# Patient Record
Sex: FEMALE | Race: BLACK OR AFRICAN AMERICAN | Employment: UNEMPLOYED | URBAN - METROPOLITAN AREA
[De-identification: names, ages, dates, MRNs, and addresses within clinical notes are randomized per-mention and may not be internally consistent; named-entity substitution may affect disease eponyms.]

---

## 2018-02-01 ENCOUNTER — HOSPITAL ENCOUNTER (OUTPATIENT)
Dept: VASCULAR SURGERY | Age: 78
Discharge: HOME | End: 2018-02-01
Attending: SINGLE SPECIALTY
Payer: MEDICAID

## 2018-02-01 DIAGNOSIS — R60.9 EDEMA: ICD-10-CM

## 2018-02-01 PROCEDURE — 93970 EXTREMITY STUDY: CPT

## 2018-02-01 NOTE — PROCEDURES
Enzo  *** FINAL REPORT ***    Name: Christy Pat  MRN: [id number]    Outpatient  : [id number]  HIS Order #: [id number]  [address] [facility] Visit #: [id number]  Date: 2018    TYPE OF TEST: Peripheral Venous Testing    REASON FOR TEST  Swelling    Right Leg:-  Deep venous thrombosis:           No  Superficial venous thrombosis:    No  Deep venous insufficiency:        Not examined  Superficial venous insufficiency: Not examined    Left Leg:-  Deep venous thrombosis:           No  Superficial venous thrombosis:    No  Deep venous insufficiency:        Not examined  Superficial venous insufficiency: Not examined      INTERPRETATION/FINDINGS  Duplex images were obtained using 2-D gray scale, color flow, and  spectral Doppler analysis. Right leg :  1. Deep vein(s) visualized include the common femoral, deep femoral,  proximal femoral, mid femoral, distal femoral, popliteal(above knee),  popliteal(fossa), popliteal(below knee), posterior tibial and peroneal   veins. 2. No evidence of deep venous thrombosis detected in the veins  visualized. 3. No evidence of superficial thrombosis detected in the proximal  greater saphenous vein. Left leg :  1. Deep vein(s) visualized include the common femoral, deep femoral,  proximal femoral, mid femoral, distal femoral, popliteal(above knee),  popliteal(fossa), popliteal(below knee), posterior tibial and peroneal   veins. 2. No evidence of deep venous thrombosis detected in the veins  visualized. 3. No evidence of superficial thrombosis detected in the proximal  greater saphenous vein. ADDITIONAL COMMENTS    I have personally reviewed the data relevant to the interpretation of  this  study. TECHNOLOGIST: Enoc Crump. DELIA Lin  Signed: 2018 03:06 PM    PHYSICIAN: Corinne Park.  Ileana Gallo MD  Signed: 2018 09:37 PM

## 2019-10-23 ENCOUNTER — HOSPITAL ENCOUNTER (EMERGENCY)
Age: 79
Discharge: HOME | End: 2019-10-23
Attending: EMERGENCY MEDICINE
Payer: MEDICAID

## 2019-10-23 ENCOUNTER — APPOINTMENT (OUTPATIENT)
Dept: GENERAL RADIOLOGY | Age: 79
End: 2019-10-23
Attending: EMERGENCY MEDICINE
Payer: MEDICAID

## 2019-10-23 VITALS
HEIGHT: 59 IN | DIASTOLIC BLOOD PRESSURE: 120 MMHG | BODY MASS INDEX: 32.11 KG/M2 | SYSTOLIC BLOOD PRESSURE: 220 MMHG | RESPIRATION RATE: 17 BRPM | HEART RATE: 96 BPM | OXYGEN SATURATION: 96 % | TEMPERATURE: 97.8 F

## 2019-10-23 DIAGNOSIS — M79.641 PAIN OF RIGHT HAND: Primary | ICD-10-CM

## 2019-10-23 DIAGNOSIS — M79.89 SWELLING OF RIGHT HAND: ICD-10-CM

## 2019-10-23 PROCEDURE — 99283 EMERGENCY DEPT VISIT LOW MDM: CPT

## 2019-10-23 PROCEDURE — 74011250637 HC RX REV CODE- 250/637: Performed by: EMERGENCY MEDICINE

## 2019-10-23 PROCEDURE — 73130 X-RAY EXAM OF HAND: CPT

## 2019-10-23 RX ORDER — SULFAMETHOXAZOLE AND TRIMETHOPRIM 800; 160 MG/1; MG/1
1 TABLET ORAL 2 TIMES DAILY
Qty: 14 TAB | Refills: 0 | Status: SHIPPED | OUTPATIENT
Start: 2019-10-23 | End: 2019-10-30

## 2019-10-23 RX ORDER — CEPHALEXIN 250 MG/1
500 CAPSULE ORAL
Status: COMPLETED | OUTPATIENT
Start: 2019-10-23 | End: 2019-10-23

## 2019-10-23 RX ORDER — IBUPROFEN 600 MG/1
600 TABLET ORAL
Status: COMPLETED | OUTPATIENT
Start: 2019-10-23 | End: 2019-10-23

## 2019-10-23 RX ORDER — SULFAMETHOXAZOLE AND TRIMETHOPRIM 800; 160 MG/1; MG/1
1 TABLET ORAL EVERY 12 HOURS
Status: DISCONTINUED | OUTPATIENT
Start: 2019-10-23 | End: 2019-10-23 | Stop reason: HOSPADM

## 2019-10-23 RX ORDER — CEPHALEXIN 500 MG/1
500 CAPSULE ORAL 4 TIMES DAILY
Qty: 28 CAP | Refills: 0 | Status: SHIPPED | OUTPATIENT
Start: 2019-10-23 | End: 2019-10-30

## 2019-10-23 RX ADMIN — SULFAMETHOXAZOLE AND TRIMETHOPRIM 1 TABLET: 800; 160 TABLET ORAL at 02:12

## 2019-10-23 RX ADMIN — CEPHALEXIN 500 MG: 250 CAPSULE ORAL at 02:13

## 2019-10-23 RX ADMIN — IBUPROFEN 600 MG: 600 TABLET, FILM COATED ORAL at 02:12

## 2019-10-23 NOTE — DISCHARGE INSTRUCTIONS
Patient Education        Hand Pain: Care Instructions  Your Care Instructions    Common causes of hand pain are overuse and injuries, such as might happen during sports or home repair projects. Everyday wear and tear, especially as you get older, also can cause hand pain. Most minor hand injuries will heal on their own, and home treatment is usually all you need to do. If you have sudden and severe pain, you may need tests and treatment. Follow-up care is a key part of your treatment and safety. Be sure to make and go to all appointments, and call your doctor if you are having problems. It's also a good idea to know your test results and keep a list of the medicines you take. How can you care for yourself at home? · Take pain medicines exactly as directed. ? If the doctor gave you a prescription medicine for pain, take it as prescribed. ? If you are not taking a prescription pain medicine, ask your doctor if you can take an over-the-counter medicine. · Rest and protect your hand. Take a break from any activity that may cause pain. · Put ice or a cold pack on your hand for 10 to 20 minutes at a time. Put a thin cloth between the ice and your skin. · Prop up the sore hand on a pillow when you ice it or anytime you sit or lie down during the next 3 days. Try to keep it above the level of your heart. This will help reduce swelling. · If your doctor recommends a sling, splint, or elastic bandage to support your hand, wear it as directed. When should you call for help? Call 911 anytime you think you may need emergency care. For example, call if:    · Your hand turns cool or pale or changes color.    Call your doctor now or seek immediate medical care if:    · You cannot move your hand.     · Your hand pops, moves out of its normal position, and then returns to its normal position.     · You have signs of infection, such as:  ? Increased pain, swelling, warmth, or redness.   ? Red streaks leading from the sore area.  ? Pus draining from a place on your hand. ? A fever.     · Your hand feels numb or tingly.    Watch closely for changes in your health, and be sure to contact your doctor if:    · Your hand feels unstable when you try to use it.     · You do not get better as expected.     · You have any new symptoms, such as swelling.     · Bruises from an injury to your hand last longer than 2 weeks. Where can you learn more? Go to http://idania-jaylon.info/. Enter R273 in the search box to learn more about \"Hand Pain: Care Instructions. \"  Current as of: June 26, 2019  Content Version: 12.2  © 4735-4583 [employer]. Care instructions adapted under license by [employer] (which disclaims liability or warranty for this information). If you have questions about a medical condition or this instruction, always ask your healthcare professional. [address] any warranty or liability for your use of this information.

## 2019-10-23 NOTE — ED TRIAGE NOTES
Patient presents to the ED for evaluation of right hand swelling. Patient states, \"I hit it against something yesterday, I think a door, and now it is swollen and painful. I can barely move my fingers. \"

## 2019-10-23 NOTE — ED NOTES
I have reviewed discharge instructions with the patient. The patient verbalized understanding. Patient armband removed and given to patient to take home. Patient was informed of the privacy risks if armband lost or stolen. PT stating she wants to take medications at home for HTN that is prescribed by her home dr.  Per Dr. Dinora Washburn, ok for pt to take medication upon arrival home.

## 2019-10-23 NOTE — ED PROVIDER NOTES
EMERGENCY DEPARTMENT HISTORY AND PHYSICAL EXAM    5:22 AM      Date: 10/23/2019  Patient Name: Eal Agustin    History of Presenting Illness     Chief Complaint   Patient presents with    Hand Swelling         History Provided By: Patient    Additional History (Context): Ela Agustin is a 67 y.o. female with history of hypertension who presents with complaint of about 1 week of right hand swelling, primarily in the back of the hand and the knuckles. She denies any fever, nausea, vomiting. She states she is not sure if she hit it on something or what happened, but it is been swollen and uncomfortable. She has not been taking anything at home for this pain. PCP: Anahi Orellana MD    Current Facility-Administered Medications   Medication Dose Route Frequency Provider Last Rate Last Dose    trimethoprim-sulfamethoxazole (BACTRIM DS, SEPTRA DS) 160-800 mg per tablet 1 Tab  1 Tab Oral Q12H Maia Cedillo MD   1 Tab at 10/23/19 0212     Current Outpatient Medications   Medication Sig Dispense Refill    trimethoprim-sulfamethoxazole (BACTRIM DS) 160-800 mg per tablet Take 1 Tab by mouth two (2) times a day for 7 days. 14 Tab 0    cephALEXin (KEFLEX) 500 mg capsule Take 1 Cap by mouth four (4) times daily for 7 days. 28 Cap 0    hydroCHLOROthiazide (MICROZIDE) 12.5 mg capsule Take 12.5 mg by mouth daily.  lisinopril (PRINIVIL, ZESTRIL) 40 mg tablet Take 40 mg by mouth daily.  traMADol (ULTRAM) 50 mg tablet Take 1 Tab by mouth every six (6) hours as needed (BREAKTHROUGH PAIN ONLY, do not fill unless bacitracin ointment and Keflex are filled. ). Max Daily Amount: 200 mg. 20 Tab 0    bacitracin (BACITRACIN) 500 unit/gram oint Apply to affected area TID X 10 DAYS 1 Tube 0       Past History     Past Medical History:  Past Medical History:   Diagnosis Date    Hypertension        Past Surgical History:  No past surgical history on file. Family History:  No family history on file.     Social History:  Social History     Tobacco Use    Smoking status: Not on file   Substance Use Topics    Alcohol use: Not on file    Drug use: Not on file       Allergies:  No Known Allergies      Review of Systems       Review of Systems   Constitutional: Negative for fever. Musculoskeletal:        As in HPI   Skin: Negative for rash. Neurological: Negative for weakness and numbness. Physical Exam     Visit Vitals  BP (!) 220/120   Pulse 96   Temp 97.8 °F (36.6 °C)   Resp 17   Ht 4' 11\" (1.499 m)   SpO2 96%   BMI 32.11 kg/m²         Physical Exam   Constitutional: She is oriented to person, place, and time. She appears well-developed and well-nourished. HENT:   Head: Normocephalic and atraumatic. Eyes: Conjunctivae are normal.   Neck: Normal range of motion. Cardiovascular: Normal rate and intact distal pulses. Pulmonary/Chest: Effort normal.   Abdominal: She exhibits no distension. Musculoskeletal: Normal range of motion. She exhibits no deformity. Dorsum of the right hand is edematous, mildly indurated. There is no induration past the wrist, there is no palpable point tenderness, no deformity. There is no crepitus or breaks in the skin. Neurological: She is alert and oriented to person, place, and time. Normal strength, sensation   Skin: Skin is warm and dry. No rash noted. Psychiatric: She has a normal mood and affect. Her behavior is normal.   Nursing note and vitals reviewed. Diagnostic Study Results     Labs -  No results found for this or any previous visit (from the past 12 hour(s)). Radiologic Studies -   XR HAND RT MIN 3 V    (Results Pending)         Medical Decision Making   I am the first provider for this patient. I reviewed the vital signs, available nursing notes, past medical history, past surgical history, family history and social history. Vital Signs-Reviewed the patient's vital signs.     Records Reviewed: Nursing Notes (Time of Review: 5:22 AM)      Provider Notes (Medical Decision Making): Dipak Richardson is a 67 y.o. female with history of hypertension who presents with complaint of about 1 week of right hand swelling, primarily in the back of the hand and the knuckles. She denies any fever, nausea, vomiting. She states she is not sure if she hit it on something or what happened, but it is been swollen and uncomfortable. She has not been taking anything at home for this pain. Exam reveals an edematous, mildly indurated dorsum of her right hand. There is no crepitus, no deformity and she appears otherwise well. Differential Diagnosis: Possible cellulitis versus arthritis. Do not suspect sepsis. X-ray obtained from triage. Testing: X-ray right hand was unremarkable  Treatments: We will treat empirically with Bactrim and Keflex and anti-inflammatories. Recommended follow-up with her primary physician. Return precautions given. Diagnosis     Clinical Impression:   1. Pain of right hand    2. Swelling of right hand        Disposition: Discharge    Follow-up Information     Follow up With Specialties Details Why Contact Info    Bravo Castro MD Family Practice Schedule an appointment as soon as possible for a visit  [address] [place] [address] Dr  [address] [facility] EMERGENCY DEPT Emergency Medicine  If symptoms worsen [address] [facility] [id number] [phone]           Discharge Medication List as of 10/23/2019  2:04 AM      START taking these medications    Details   trimethoprim-sulfamethoxazole (BACTRIM DS) 160-800 mg per tablet Take 1 Tab by mouth two (2) times a day for 7 days. , Print, Disp-14 Tab, R-0      cephALEXin (KEFLEX) 500 mg capsule Take 1 Cap by mouth four (4) times daily for 7 days. , Print, Disp-28 Cap, R-0         CONTINUE these medications which have NOT CHANGED    Details   hydroCHLOROthiazide (MICROZIDE) 12.5 mg capsule Take 12.5 mg by mouth daily. , Historical Med      lisinopril (PRINIVIL, ZESTRIL) 40 mg tablet Take 40 mg by mouth daily. , Historical Med      traMADol (ULTRAM) 50 mg tablet Take 1 Tab by mouth every six (6) hours as needed (BREAKTHROUGH PAIN ONLY, do not fill unless bacitracin ointment and Keflex are filled. ). Max Daily Amount: 200 mg., Print, Disp-20 Tab, R-0      bacitracin (BACITRACIN) 500 unit/gram oint Apply to affected area TID X 10 DAYS, Print, Disp-1 Tube, R-0           _______________________________    Attestations:  Hollis Garcia MD acting as a scribe for and in the presence of No att. providers found      October 23, 2019 at 5:25 AM       Provider Attestation:      I personally performed the services described in the documentation, reviewed the documentation, as recorded by the scribe in my presence, and it accurately and completely records my words and actions.  October 23, 2019 at 5:25 AM - No att. providers found    _______________________________

## 2021-05-30 ENCOUNTER — APPOINTMENT (OUTPATIENT)
Dept: VASCULAR SURGERY | Age: 81
End: 2021-05-30
Attending: EMERGENCY MEDICINE
Payer: MEDICAID

## 2021-05-30 ENCOUNTER — HOSPITAL ENCOUNTER (EMERGENCY)
Age: 81
Discharge: HOME | End: 2021-05-30
Attending: EMERGENCY MEDICINE
Payer: MEDICAID

## 2021-05-30 VITALS
OXYGEN SATURATION: 100 % | DIASTOLIC BLOOD PRESSURE: 63 MMHG | HEART RATE: 99 BPM | BODY MASS INDEX: 28.43 KG/M2 | TEMPERATURE: 98.8 F | WEIGHT: 141 LBS | RESPIRATION RATE: 18 BRPM | HEIGHT: 59 IN | SYSTOLIC BLOOD PRESSURE: 128 MMHG

## 2021-05-30 DIAGNOSIS — M79.651 PAIN OF RIGHT THIGH: Primary | ICD-10-CM

## 2021-05-30 LAB
ANION GAP SERPL CALC-SCNC: 10 MMOL/L (ref 3–18)
BASOPHILS # BLD: 0 K/UL (ref 0–0.1)
BASOPHILS NFR BLD: 0 % (ref 0–2)
BUN SERPL-MCNC: 30 MG/DL (ref 7–18)
BUN/CREAT SERPL: 25 (ref 12–20)
CALCIUM SERPL-MCNC: 9.9 MG/DL (ref 8.5–10.1)
CHLORIDE SERPL-SCNC: 101 MMOL/L (ref 100–111)
CO2 SERPL-SCNC: 25 MMOL/L (ref 21–32)
CREAT SERPL-MCNC: 1.21 MG/DL (ref 0.6–1.3)
DIFFERENTIAL METHOD BLD: ABNORMAL
EOSINOPHIL # BLD: 0.2 K/UL (ref 0–0.4)
EOSINOPHIL NFR BLD: 1 % (ref 0–5)
ERYTHROCYTE [DISTWIDTH] IN BLOOD BY AUTOMATED COUNT: 14.6 % (ref 11.6–14.5)
GLUCOSE SERPL-MCNC: 185 MG/DL (ref 74–99)
HCT VFR BLD AUTO: 35 % (ref 35–45)
HGB BLD-MCNC: 11.6 G/DL (ref 12–16)
LYMPHOCYTES # BLD: 3.2 K/UL (ref 0.9–3.6)
LYMPHOCYTES NFR BLD: 21 % (ref 21–52)
MCH RBC QN AUTO: 26.2 PG (ref 24–34)
MCHC RBC AUTO-ENTMCNC: 33.1 G/DL (ref 31–37)
MCV RBC AUTO: 79.2 FL (ref 74–97)
MONOCYTES # BLD: 1.1 K/UL (ref 0.05–1.2)
MONOCYTES NFR BLD: 7 % (ref 3–10)
NEUTS SEG # BLD: 10.5 K/UL (ref 1.8–8)
NEUTS SEG NFR BLD: 70 % (ref 40–73)
PLATELET # BLD AUTO: 537 K/UL (ref 135–420)
PMV BLD AUTO: 9.9 FL (ref 9.2–11.8)
POTASSIUM SERPL-SCNC: 3.4 MMOL/L (ref 3.5–5.5)
RBC # BLD AUTO: 4.42 M/UL (ref 4.2–5.3)
SODIUM SERPL-SCNC: 136 MMOL/L (ref 136–145)
WBC # BLD AUTO: 15.1 K/UL (ref 4.6–13.2)

## 2021-05-30 PROCEDURE — 80048 BASIC METABOLIC PNL TOTAL CA: CPT

## 2021-05-30 PROCEDURE — 99285 EMERGENCY DEPT VISIT HI MDM: CPT

## 2021-05-30 PROCEDURE — 85025 COMPLETE CBC W/AUTO DIFF WBC: CPT

## 2021-05-30 PROCEDURE — 74011250637 HC RX REV CODE- 250/637: Performed by: EMERGENCY MEDICINE

## 2021-05-30 PROCEDURE — 93971 EXTREMITY STUDY: CPT

## 2021-05-30 RX ORDER — ACETAMINOPHEN 500 MG
1000 TABLET ORAL ONCE
Status: COMPLETED | OUTPATIENT
Start: 2021-05-30 | End: 2021-05-30

## 2021-05-30 RX ADMIN — ACETAMINOPHEN 1000 MG: 500 TABLET ORAL at 10:15

## 2021-05-30 NOTE — ED PROVIDER NOTES
EMERGENCY DEPARTMENT HISTORY AND PHYSICAL EXAM    9:23 AM patient seen at this time in room 1      Date: 5/30/2021  Patient Name: Blanca Carcamo    History of Presenting Illness     Chief Complaint   Patient presents with    Extremity Weakness         History Provided By: patient    Additional History (Context): Blanca Carcamo is a [de-identified] y.o. female presents with history of right leg weakness uses a cane, today feeling like it is giving out. Has pain in the posterior aspect of her thigh. No trauma. No history of DVT. Pain is moderate. Lysbeth Carrel PCP: Temi Barros MD    Chief Complaint:   Duration:    Timing:    Location:   Quality:   Severity:   Modifying Factors:   Associated Symptoms:       Current Outpatient Medications   Medication Sig Dispense Refill    hydroCHLOROthiazide (MICROZIDE) 12.5 mg capsule Take 12.5 mg by mouth daily.  lisinopril (PRINIVIL, ZESTRIL) 40 mg tablet Take 40 mg by mouth daily.  traMADol (ULTRAM) 50 mg tablet Take 1 Tab by mouth every six (6) hours as needed (BREAKTHROUGH PAIN ONLY, do not fill unless bacitracin ointment and Keflex are filled. ). Max Daily Amount: 200 mg. 20 Tab 0    bacitracin (BACITRACIN) 500 unit/gram oint Apply to affected area TID X 10 DAYS 1 Tube 0       Past History     Past Medical History:  Past Medical History:   Diagnosis Date    Hypertension        Past Surgical History:  No past surgical history on file. Family History:  No family history on file. Social History:  Social History     Tobacco Use    Smoking status: Not on file   Substance Use Topics    Alcohol use: Not on file    Drug use: Not on file       Allergies:  No Known Allergies      Review of Systems     Review of Systems   Constitutional: Negative for diaphoresis and fever. HENT: Negative for congestion and sore throat. Eyes: Negative for pain and itching. Respiratory: Negative for cough and shortness of breath. Cardiovascular: Negative for chest pain and palpitations. Gastrointestinal: Negative for abdominal pain and diarrhea. Endocrine: Negative for polydipsia and polyuria. Genitourinary: Negative for dysuria and hematuria. Musculoskeletal: Positive for myalgias. Negative for arthralgias. Skin: Negative for rash and wound. Neurological: Negative for seizures and syncope. Hematological: Does not bruise/bleed easily. Psychiatric/Behavioral: Negative for agitation and hallucinations. Physical Exam       Patient Vitals for the past 12 hrs:   Temp Pulse Resp BP SpO2   05/30/21 1215    108/88 100 %   05/30/21 1200    (!) 107/91 100 %   05/30/21 1100  99 18 (!) 143/86 100 %   05/30/21 1000  (!) 108 18 (!) 152/66 100 %   05/30/21 0915  (!) 109 23 (!) 133/107 100 %   05/30/21 0900 98.8 °F (37.1 °C) (!) 111 17 (!) 139/99 100 %       Physical Exam  Vitals and nursing note reviewed. Constitutional:       Appearance: She is well-developed. HENT:      Head: Normocephalic and atraumatic. Eyes:      General: No scleral icterus. Conjunctiva/sclera: Conjunctivae normal.   Neck:      Vascular: No JVD. Cardiovascular:      Rate and Rhythm: Normal rate and regular rhythm. Heart sounds: Normal heart sounds. Comments: 4 intact extremity pulses  Pulmonary:      Effort: Pulmonary effort is normal.      Breath sounds: Normal breath sounds. Abdominal:      Palpations: Abdomen is soft. There is no mass. Tenderness: There is no abdominal tenderness. Musculoskeletal:         General: Normal range of motion. Cervical back: Normal range of motion and neck supple. Comments: Patient was undressed, inspection of the entirety of the right lower extremity there is no pain with movement of the joints no crepitation no skin abnormalities no focal area of tenderness, no rash or warmth no palpable cords. Objectively she is neurovascularly intact throughout the leg. Lymphadenopathy:      Cervical: No cervical adenopathy.    Skin:     General: Skin is warm and dry. Neurological:      Mental Status: She is alert. Diagnostic Study Results   Labs -  Recent Results (from the past 12 hour(s))   CBC WITH AUTOMATED DIFF    Collection Time: 05/30/21  8:56 AM   Result Value Ref Range    WBC 15.1 (H) 4.6 - 13.2 K/uL    RBC 4.42 4.20 - 5.30 M/uL    HGB 11.6 (L) 12.0 - 16.0 g/dL    HCT 35.0 35.0 - 45.0 %    MCV 79.2 74.0 - 97.0 FL    MCH 26.2 24.0 - 34.0 PG    MCHC 33.1 31.0 - 37.0 g/dL    RDW 14.6 (H) 11.6 - 14.5 %    PLATELET 920 (H) 812 - 420 K/uL    MPV 9.9 9.2 - 11.8 FL    NEUTROPHILS 70 40 - 73 %    LYMPHOCYTES 21 21 - 52 %    MONOCYTES 7 3 - 10 %    EOSINOPHILS 1 0 - 5 %    BASOPHILS 0 0 - 2 %    ABS. NEUTROPHILS 10.5 (H) 1.8 - 8.0 K/UL    ABS. LYMPHOCYTES 3.2 0.9 - 3.6 K/UL    ABS. MONOCYTES 1.1 0.05 - 1.2 K/UL    ABS. EOSINOPHILS 0.2 0.0 - 0.4 K/UL    ABS. BASOPHILS 0.0 0.0 - 0.1 K/UL    DF AUTOMATED     METABOLIC PANEL, BASIC    Collection Time: 05/30/21  8:56 AM   Result Value Ref Range    Sodium 136 136 - 145 mmol/L    Potassium 3.4 (L) 3.5 - 5.5 mmol/L    Chloride 101 100 - 111 mmol/L    CO2 25 21 - 32 mmol/L    Anion gap 10 3.0 - 18 mmol/L    Glucose 185 (H) 74 - 99 mg/dL    BUN 30 (H) 7.0 - 18 MG/DL    Creatinine 1.21 0.6 - 1.3 MG/DL    BUN/Creatinine ratio 25 (H) 12 - 20      GFR est AA 52 (L) >60 ml/min/1.73m2    GFR est non-AA 43 (L) >60 ml/min/1.73m2    Calcium 9.9 8.5 - 10.1 MG/DL       Radiologic Studies -   No orders to display     No results found. Medications ordered:   Medications   acetaminophen (TYLENOL) tablet 1,000 mg (1,000 mg Oral Given 5/30/21 1015)         Medical Decision Making   Initial Medical Decision Making and DDx:  Strength is good. Sensation intact. Vascular intact. No signs of fracture contusion hematoma or cellulitis. We will get PVL for DVT. Do not suspect radiculopathy stroke spinal cord impingement.   Patient is not septic  Tachycardia noted but she did not take her medications this morning    ED Course: Progress Notes, Reevaluation, and Consults:     1:00 PM Pt reevaluated at this time. Discussed results and findings, as well as, diagnosis and plan for discharge. Follow up with doctors/services listed. Return to the emergency department for alarming symptoms. Pt verbalizes understanding and agreement with plan. All questions addressed. Discussed results with her grandson who is here and her caretaker. No evidence for DVT  Reassessed the patient, she is able to bear weight. No severe distress no toxic appearance. She will be discharged. Primary care follow-up. Return to the emergency department for any new or alarming problems. I am the first provider for this patient. I reviewed the vital signs, available nursing notes, past medical history, past surgical history, family history and social history. Patient Vitals for the past 12 hrs:   Temp Pulse Resp BP SpO2   05/30/21 1215    108/88 100 %   05/30/21 1200    (!) 107/91 100 %   05/30/21 1100  99 18 (!) 143/86 100 %   05/30/21 1000  (!) 108 18 (!) 152/66 100 %   05/30/21 0915  (!) 109 23 (!) 133/107 100 %   05/30/21 0900 98.8 °F (37.1 °C) (!) 111 17 (!) 139/99 100 %       Vital Signs-Reviewed the patient's vital signs. Pulse Oximetry Analysis, Cardiac Monitor, 12 lead ekg:       Interpreted by the EP. Records Reviewed: Nursing notes reviewed (Time of Review: 9:23 AM)    Procedures:   Critical Care Time:   Aspirin: (was aspirin given for stroke?)    Diagnosis     Clinical Impression:   1.  Pain of right thigh        Disposition: Discharged      Follow-up Information     Follow up With Specialties Details Why Contact Info    Luís Herrera MD Family Medicine In 3 days  [address] [facility]  [address] [facility]  [phone]             Patient's Medications   Start Taking    No medications on file   Continue Taking    BACITRACIN (BACITRACIN) 500 UNIT/GRAM OINT    Apply to affected area TID X 10 DAYS HYDROCHLOROTHIAZIDE (MICROZIDE) 12.5 MG CAPSULE    Take 12.5 mg by mouth daily. LISINOPRIL (PRINIVIL, ZESTRIL) 40 MG TABLET    Take 40 mg by mouth daily. TRAMADOL (ULTRAM) 50 MG TABLET    Take 1 Tab by mouth every six (6) hours as needed (BREAKTHROUGH PAIN ONLY, do not fill unless bacitracin ointment and Keflex are filled. ). Max Daily Amount: 200 mg.    These Medications have changed    No medications on file   Stop Taking    No medications on file     _______________________________    Notes:    Leti Luna MD using Dragon dictation      _______________________________

## 2021-05-30 NOTE — ED TRIAGE NOTES
Per medic: patient states that last night she got up to go to the bathroom and her legs were really weak. Patient states her legs are normally weak however since last night they feel more weaker than normal. Upon EMS arrival patient was able to walk with her walker however she continued complaining of bilat. Leg weakness.

## 2021-05-30 NOTE — ED NOTES
Patient returned from ultrasound Report given to CARLOS ALBERTO Tapia. Pt to be transported to T216 when nurse is available.

## 2021-05-30 NOTE — DISCHARGE INSTRUCTIONS
Follow-up with primary care doctor. Unclear cause of the patient's pain at this time.   Tylenol for pain control

## 2021-05-30 NOTE — ED NOTES
Pt explained discharge instructions. Demonstrated understanding. Discharge via wheelchair with family.

## 2023-01-10 ENCOUNTER — APPOINTMENT (OUTPATIENT)
Dept: GENERAL RADIOLOGY | Age: 83
End: 2023-01-10
Attending: EMERGENCY MEDICINE
Payer: MEDICAID

## 2023-01-10 ENCOUNTER — HOSPITAL ENCOUNTER (EMERGENCY)
Age: 83
Discharge: HOME | End: 2023-01-10
Attending: EMERGENCY MEDICINE
Payer: MEDICAID

## 2023-01-10 VITALS
HEART RATE: 90 BPM | RESPIRATION RATE: 18 BRPM | OXYGEN SATURATION: 97 % | TEMPERATURE: 97 F | DIASTOLIC BLOOD PRESSURE: 64 MMHG | SYSTOLIC BLOOD PRESSURE: 114 MMHG

## 2023-01-10 DIAGNOSIS — M19.90 ARTHRITIS: Primary | ICD-10-CM

## 2023-01-10 PROCEDURE — 73130 X-RAY EXAM OF HAND: CPT

## 2023-01-10 PROCEDURE — 73100 X-RAY EXAM OF WRIST: CPT

## 2023-01-10 PROCEDURE — 99283 EMERGENCY DEPT VISIT LOW MDM: CPT

## 2023-01-10 RX ORDER — TRAMADOL HYDROCHLORIDE 50 MG/1
50 TABLET ORAL
Qty: 20 TABLET | Refills: 0 | Status: SHIPPED | OUTPATIENT
Start: 2023-01-10 | End: 2023-01-15

## 2023-01-10 NOTE — ED PROVIDER NOTES
EMERGENCY DEPARTMENT HISTORY AND PHYSICAL EXAM    12:31 PM      Date: 1/10/2023  Patient Name: Samm Beck    History of Presenting Illness     Chief Complaint   Patient presents with    Rib Pain    Hand Swelling         History Provided By: Patient  Location/Duration/Severity/Modifying factors   Is an 25-year-old -American female presents with right hand pain and swelling for several weeks. Patient states she saw her primary care physician. She has been given some pain medication with no improvement. Patient has a difficult time making a fist in the right hand. She denies any trauma. She states the pain is mild, nonradiating, worse on the right third MCP. She states BC powders makes better. Additionally the patient complains of right mid abdominal pain. She states the pain is sharp, nonradiating, intermittent, lasts about 30 seconds. She is currently not having any pain. She denies any fever, chills, nausea, vomiting or diarrhea. Patient denies urinary symptoms. Patient denies smoking, alcohol recreational drug use. I did speak with patient's daughter who he stated patient has seen PCP and was diagnosed with osteoarthritis of the right hand. No other complaints at this time. PCP: Lillian Rodriguez MD    Current Outpatient Medications   Medication Sig Dispense Refill    traMADoL (ULTRAM) 50 mg tablet Take 1 Tablet by mouth every six (6) hours as needed (BREAKTHROUGH PAIN ONLY, do not fill unless bacitracin ointment and Keflex are filled.) for up to 5 days. Max Daily Amount: 200 mg. 20 Tablet 0    hydroCHLOROthiazide (MICROZIDE) 12.5 mg capsule Take 12.5 mg by mouth daily. lisinopril (PRINIVIL, ZESTRIL) 40 mg tablet Take 40 mg by mouth daily.       bacitracin (BACITRACIN) 500 unit/gram oint Apply to affected area TID X 10 DAYS 1 Tube 0       Past History     Past Medical History:  Past Medical History:   Diagnosis Date    Hypertension        Past Surgical History:  No past surgical history on file. Family History:  No family history on file. Social History: Allergies:  No Known Allergies      Review of Systems       Review of Systems   Constitutional: Negative. Negative for chills, diaphoresis and fever. HENT: Negative. Negative for congestion, rhinorrhea and sore throat. Eyes: Negative. Negative for pain, discharge and redness. Respiratory: Negative. Negative for cough, chest tightness, shortness of breath and wheezing. Cardiovascular: Negative. Negative for chest pain. Gastrointestinal: Negative. Negative for abdominal pain, constipation, diarrhea, nausea and vomiting. Genitourinary: Negative. Negative for dysuria, flank pain, frequency, hematuria and urgency. Musculoskeletal:  Positive for arthralgias. Negative for back pain and neck pain. Skin: Negative. Negative for rash. Neurological: Negative. Negative for syncope, weakness, numbness and headaches. Psychiatric/Behavioral: Negative. All other systems reviewed and are negative. Physical Exam   Visit Vitals  /64   Pulse 90   Temp 97 °F (36.1 °C)   Resp 18   SpO2 97%         Physical Exam  Vitals and nursing note reviewed. Constitutional:       General: She is not in acute distress. Appearance: Normal appearance. She is well-developed. She is not ill-appearing, toxic-appearing or diaphoretic. HENT:      Head: Normocephalic and atraumatic. Mouth/Throat:      Pharynx: No oropharyngeal exudate. Eyes:      General: No scleral icterus. Conjunctiva/sclera: Conjunctivae normal.      Pupils: Pupils are equal, round, and reactive to light. Neck:      Thyroid: No thyromegaly. Vascular: No hepatojugular reflux or JVD. Trachea: No tracheal deviation. Cardiovascular:      Rate and Rhythm: Normal rate and regular rhythm. Pulses: Normal pulses. Radial pulses are 2+ on the right side and 2+ on the left side.         Dorsalis pedis pulses are 2+ on the right side and 2+ on the left side. Heart sounds: Normal heart sounds, S1 normal and S2 normal. No murmur heard. No gallop. No S3 or S4 sounds. Pulmonary:      Effort: Pulmonary effort is normal. No respiratory distress. Breath sounds: Normal breath sounds. No decreased breath sounds, wheezing, rhonchi or rales. Abdominal:      General: Bowel sounds are normal. There is no distension. Palpations: Abdomen is soft. Abdomen is not rigid. There is no mass. Tenderness: There is no abdominal tenderness. There is no guarding or rebound. Negative signs include Powell's sign and McBurney's sign. Musculoskeletal:         General: Swelling and tenderness present. No deformity or signs of injury. Normal range of motion. Hands:       Cervical back: Normal range of motion and neck supple. Lymphadenopathy:      Head:      Right side of head: No submental, submandibular, preauricular or occipital adenopathy. Left side of head: No submental, submandibular, preauricular or occipital adenopathy. Cervical: No cervical adenopathy. Upper Body:      Right upper body: No supraclavicular adenopathy. Left upper body: No supraclavicular adenopathy. Skin:     General: Skin is warm and dry. Findings: No rash. Neurological:      Mental Status: She is alert. She is not disoriented. GCS: GCS eye subscore is 4. GCS verbal subscore is 5. GCS motor subscore is 6. Cranial Nerves: No cranial nerve deficit. Sensory: No sensory deficit. Coordination: Coordination normal.      Gait: Gait normal.      Deep Tendon Reflexes: Reflexes are normal and symmetric. Psychiatric:         Speech: Speech normal.         Behavior: Behavior normal.         Thought Content: Thought content normal.         Judgment: Judgment normal.         Diagnostic Study Results     Labs -  No results found for this or any previous visit (from the past 12 hour(s)).     Radiologic Studies -   XR WRIST RT AP/LAT   Final Result      No acute osseous findings. Uniform joint space narrowing at the second/third MCP articulation. Nonspecific,   can be seen in the setting of CPPD arthropathy. XR HAND RT MIN 3 V   Final Result      No acute osseous findings. Uniform joint space narrowing at the second/third MCP articulation. Nonspecific,   can be seen in the setting of CPPD arthropathy. Medical Decision Making   I am the first provider for this patient. I reviewed the vital signs, available nursing notes, past medical history, past surgical history, family history and social history. Vital Signs-Reviewed the patient's vital signs. Records Reviewed: Nursing Notes (Time of Review: 12:31 PM)    ED Course: Progress Notes, Reevaluation, and Consults:       Review of x-ray showed moderate to severe arthritic changes. Will discuss findings with patient. Sent patient home with [facility].    Provider Notes (Medical Decision Making):   MDM  Number of Diagnoses or Management Options  Arthritis  Diagnosis management comments: Patient is an 81-year-old -American female presents with a right hand swelling seems chronic in nature. Physical findings consistent with arthritis. Patient additionally complains of intermittent right sided abdominal pain. Physical exam completely normal of the abdomen. We will order an x-ray of the right hand. At this time I do not feel any abdominal work-up is needed. This was confirmed with her daughter's history.        Amount and/or Complexity of Data Reviewed  Tests in the radiology section of CPT®: reviewed  Discussion of test results with the performing providers: no  Decide to obtain previous medical records or to obtain history from someone other than the patient: no  Obtain history from someone other than the patient: yes  Review and summarize past medical records: no  Discuss the patient with other providers: no  Independent visualization of images, tracings, or specimens: yes    Risk of Complications, Morbidity, and/or Mortality  Presenting problems: low  Diagnostic procedures: low  Management options: low  General comments: Patient will be treated with pain medication. We will try Ultram and patient to follow-up with rheumatology. Procedures        Diagnosis     Clinical Impression:   1. Arthritis        Disposition: Discharged home    Follow-up Information       Follow up With Specialties Details Why Contact Info    Odin Greenfield MD Family Medicine In 2 days  [address] [place] [address] Dr  [address]  [phone]               Current Discharge Medication List        CONTINUE these medications which have CHANGED    Details   traMADoL (ULTRAM) 50 mg tablet Take 1 Tablet by mouth every six (6) hours as needed (BREAKTHROUGH PAIN ONLY, do not fill unless bacitracin ointment and Keflex are filled.) for up to 5 days. Max Daily Amount: 200 mg. Qty: 20 Tablet, Refills: 0    Associated Diagnoses: Arthritis           CONTINUE these medications which have NOT CHANGED    Details   hydroCHLOROthiazide (MICROZIDE) 12.5 mg capsule Take 12.5 mg by mouth daily. lisinopril (PRINIVIL, ZESTRIL) 40 mg tablet Take 40 mg by mouth daily. bacitracin (BACITRACIN) 500 unit/gram oint Apply to affected area TID X 10 DAYS  Qty: 1 Tube, Refills: 0           Disclaimer: Sections of this note are dictated using utilizing voice recognition software. Minor typographical errors may be present. If questions arise, please do not hesitate to contact me or call our department.

## 2023-01-10 NOTE — ED NOTES
Left before receiving discharge paperwork, stated they have to go and will come back to  papers later.

## 2023-01-10 NOTE — ED TRIAGE NOTES
R side pain started last night, improved after family member placed lidocaine patch on it. Pt also complaining of R wrist/knuckle swelling, denies injury, hx arthritis.